# Patient Record
Sex: FEMALE | Race: WHITE | NOT HISPANIC OR LATINO | Employment: STUDENT | URBAN - METROPOLITAN AREA
[De-identification: names, ages, dates, MRNs, and addresses within clinical notes are randomized per-mention and may not be internally consistent; named-entity substitution may affect disease eponyms.]

---
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
## 2018-01-14 VITALS — SYSTOLIC BLOOD PRESSURE: 120 MMHG | DIASTOLIC BLOOD PRESSURE: 70 MMHG | WEIGHT: 171 LBS | BODY MASS INDEX: 23.19 KG/M2

## 2019-02-24 ENCOUNTER — OFFICE VISIT (OUTPATIENT)
Dept: URGENT CARE | Age: 19
End: 2019-02-24
Payer: COMMERCIAL

## 2019-02-24 VITALS
RESPIRATION RATE: 16 BRPM | DIASTOLIC BLOOD PRESSURE: 67 MMHG | WEIGHT: 169 LBS | TEMPERATURE: 98 F | HEIGHT: 72 IN | SYSTOLIC BLOOD PRESSURE: 113 MMHG | HEART RATE: 74 BPM | BODY MASS INDEX: 22.89 KG/M2 | OXYGEN SATURATION: 94 %

## 2019-02-24 DIAGNOSIS — B37.9 CANDIDIASIS: ICD-10-CM

## 2019-02-24 DIAGNOSIS — N39.0 URINARY TRACT INFECTION WITH HEMATURIA, SITE UNSPECIFIED: Primary | ICD-10-CM

## 2019-02-24 DIAGNOSIS — R31.9 URINARY TRACT INFECTION WITH HEMATURIA, SITE UNSPECIFIED: Primary | ICD-10-CM

## 2019-02-24 LAB
SL AMB  POCT GLUCOSE, UA: ABNORMAL
SL AMB LEUKOCYTE ESTERASE,UA: ABNORMAL
SL AMB POCT BILIRUBIN,UA: ABNORMAL
SL AMB POCT BLOOD,UA: ABNORMAL
SL AMB POCT CLARITY,UA: ABNORMAL
SL AMB POCT COLOR,UA: YELLOW
SL AMB POCT KETONES,UA: ABNORMAL
SL AMB POCT NITRITE,UA: ABNORMAL
SL AMB POCT PH,UA: 6.5
SL AMB POCT SPECIFIC GRAVITY,UA: 1.02
SL AMB POCT URINE PROTEIN: ABNORMAL
SL AMB POCT UROBILINOGEN: 0.2

## 2019-02-24 PROCEDURE — 81002 URINALYSIS NONAUTO W/O SCOPE: CPT | Performed by: FAMILY MEDICINE

## 2019-02-24 PROCEDURE — G0382 LEV 3 HOSP TYPE B ED VISIT: HCPCS | Performed by: FAMILY MEDICINE

## 2019-02-24 PROCEDURE — 99283 EMERGENCY DEPT VISIT LOW MDM: CPT | Performed by: FAMILY MEDICINE

## 2019-02-24 PROCEDURE — 87086 URINE CULTURE/COLONY COUNT: CPT | Performed by: PHYSICIAN ASSISTANT

## 2019-02-24 RX ORDER — FLUCONAZOLE 150 MG/1
150 TABLET ORAL ONCE
Qty: 1 TABLET | Refills: 0 | Status: SHIPPED | OUTPATIENT
Start: 2019-02-24 | End: 2019-02-24

## 2019-02-24 RX ORDER — NITROFURANTOIN 25; 75 MG/1; MG/1
100 CAPSULE ORAL 2 TIMES DAILY
Qty: 14 CAPSULE | Refills: 0 | Status: SHIPPED | OUTPATIENT
Start: 2019-02-24 | End: 2019-03-03

## 2019-02-24 RX ORDER — NORGESTIMATE AND ETHINYL ESTRADIOL 7DAYSX3 28
1 KIT ORAL DAILY
COMMUNITY

## 2019-02-24 NOTE — PROGRESS NOTES
Started with a yeast infection or feeling uncomfortable 4 days ago  Tried Monistat for 2 days without relief  Now feels it's a UTI  Feels burning at the end of the stream today

## 2019-02-25 LAB — BACTERIA UR CULT: NORMAL

## 2019-02-27 NOTE — PROGRESS NOTES
[facility]        NAME: Florian Ruiz is a 19 y o  female  : 2000    MRN: [id number]  DATE: 2019  TIME: 10:53 AM    Assessment and Plan   Urinary tract infection with hematuria, site unspecified [N39 0, R31 9]  1  Urinary tract infection with hematuria, site unspecified  POCT urine dip    Urine culture    nitrofurantoin (MACROBID) 100 mg capsule   2  Candidiasis  fluconazole (DIFLUCAN) 150 mg tablet         Patient Instructions       Follow up with PCP in 3-5 days  Proceed to  ER if symptoms worsen  Chief Complaint     Chief Complaint   Patient presents with    Possible UTI         History of Present Illness       Patient here for evaluation of possible UTI  She has burning pressure with frequency with urination  She did have possible yeast infection which she treated with minimal relief        Review of Systems   Review of Systems      Current Medications       Current Outpatient Medications:     norgestimate-ethinyl estradiol (TRI-LINYAH) 0 18/0 215/0 25 MG-35 MCG per tablet, Take 1 tablet by mouth daily, Disp: , Rfl:     nitrofurantoin (MACROBID) 100 mg capsule, Take 1 capsule (100 mg total) by mouth 2 (two) times a day for 7 days, Disp: 14 capsule, Rfl: 0    phenazopyridine (PYRIDIUM) 200 mg tablet, Take 1 tablet by mouth 3 (three) times a day as needed for bladder spasms (Patient not taking: Reported on 2019), Disp: 4 tablet, Rfl: 0    Current Allergies     Allergies as of 2019    (No Known Allergies)            The following portions of the patient's history were reviewed and updated as appropriate: allergies, current medications, past family history, past medical history, past social history, past surgical history and problem list      Past Medical History:   Diagnosis Date    Exercise-induced asthma        Past Surgical History:   Procedure Laterality Date    ANTERIOR CRUCIATE LIGAMENT REPAIR      PATELLA SURGERY      WISDOM TOOTH EXTRACTION Family History   Problem Relation Age of Onset    No Known Problems Mother     No Known Problems Father          Medications have been verified  Objective   /67 (BP Location: Left arm, Patient Position: Sitting, Cuff Size: Standard)   Pulse 74   Temp 98 °F (36 7 °C) (Temporal)   Resp 16   Ht 6' 1" (1 854 m)   Wt 76 7 kg (169 lb)   LMP 02/24/2019   SpO2 94%   BMI 22 30 kg/m²        Physical Exam     Physical Exam   Constitutional: She is oriented to person, place, and time  She appears well-developed and well-nourished  No distress  HENT:   Head: Normocephalic and atraumatic  Abdominal: Soft  Bowel sounds are normal  She exhibits no distension  There is no tenderness  There is no guarding  No CVA tenderness   Neurological: She is alert and oriented to person, place, and time  Skin: Skin is warm and dry  Psychiatric: She has a normal mood and affect  Her behavior is normal    Nursing note and vitals reviewed

## 2020-10-05 ENCOUNTER — APPOINTMENT (RX ONLY)
Dept: URBAN - METROPOLITAN AREA CLINIC 16 | Facility: CLINIC | Age: 20
Setting detail: DERMATOLOGY
End: 2020-10-05

## 2020-10-05 DIAGNOSIS — L738 OTHER SPECIFIED DISEASES OF HAIR AND HAIR FOLLICLES: ICD-10-CM

## 2020-10-05 DIAGNOSIS — L73.9 FOLLICULAR DISORDER, UNSPECIFIED: ICD-10-CM

## 2020-10-05 DIAGNOSIS — L71.0 PERIORAL DERMATITIS: ICD-10-CM

## 2020-10-05 DIAGNOSIS — Q819 OTHER SPECIFIED ANOMALIES OF SKIN: ICD-10-CM

## 2020-10-05 DIAGNOSIS — L663 OTHER SPECIFIED DISEASES OF HAIR AND HAIR FOLLICLES: ICD-10-CM

## 2020-10-05 DIAGNOSIS — Q826 OTHER SPECIFIED ANOMALIES OF SKIN: ICD-10-CM

## 2020-10-05 DIAGNOSIS — Q828 OTHER SPECIFIED ANOMALIES OF SKIN: ICD-10-CM

## 2020-10-05 PROBLEM — L02.426 FURUNCLE OF LEFT LOWER LIMB: Status: ACTIVE | Noted: 2020-10-05

## 2020-10-05 PROBLEM — L02.424 FURUNCLE OF LEFT UPPER LIMB: Status: ACTIVE | Noted: 2020-10-05

## 2020-10-05 PROBLEM — L02.425 FURUNCLE OF RIGHT LOWER LIMB: Status: ACTIVE | Noted: 2020-10-05

## 2020-10-05 PROBLEM — L85.8 OTHER SPECIFIED EPIDERMAL THICKENING: Status: ACTIVE | Noted: 2020-10-05

## 2020-10-05 PROBLEM — L02.423 FURUNCLE OF RIGHT UPPER LIMB: Status: ACTIVE | Noted: 2020-10-05

## 2020-10-05 PROCEDURE — ? DIAGNOSIS COMMENT

## 2020-10-05 PROCEDURE — 99202 OFFICE O/P NEW SF 15 MIN: CPT

## 2020-10-05 PROCEDURE — ? TREATMENT REGIMEN

## 2020-10-05 PROCEDURE — ? COUNSELING

## 2020-10-05 PROCEDURE — ? PRESCRIPTION

## 2020-10-05 RX ORDER — CLINDAMYCIN PHOSPHATE 10 MG/ML
LOTION TOPICAL
Qty: 1 | Refills: 2 | Status: ERX | COMMUNITY
Start: 2020-10-05

## 2020-10-05 RX ORDER — PIMECROLIMUS 10 MG/G
CREAM TOPICAL BID
Qty: 1 | Refills: 1 | Status: ERX | COMMUNITY
Start: 2020-10-05

## 2020-10-05 RX ORDER — METRONIDAZOLE 7.5 MG/G
CREAM TOPICAL
Qty: 1 | Refills: 3 | Status: ERX | COMMUNITY
Start: 2020-10-05

## 2020-10-05 RX ORDER — DOXYCYCLINE HYCLATE 100 MG/1
CAPSULE, GELATIN COATED ORAL
Qty: 60 | Refills: 2 | Status: ERX | COMMUNITY
Start: 2020-10-05

## 2020-10-05 RX ORDER — TRETIONIN 0.25 MG/G
CREAM TOPICAL
Qty: 1 | Refills: 4 | Status: ERX | COMMUNITY
Start: 2020-10-05

## 2020-10-05 RX ADMIN — DOXYCYCLINE HYCLATE: 100 CAPSULE, GELATIN COATED ORAL at 00:00

## 2020-10-05 RX ADMIN — TRETIONIN: 0.25 CREAM TOPICAL at 00:00

## 2020-10-05 RX ADMIN — METRONIDAZOLE: 7.5 CREAM TOPICAL at 00:00

## 2020-10-05 RX ADMIN — PIMECROLIMUS: 10 CREAM TOPICAL at 00:00

## 2020-10-05 RX ADMIN — CLINDAMYCIN PHOSPHATE: 10 LOTION TOPICAL at 00:00

## 2020-10-05 ASSESSMENT — LOCATION ZONE DERM
LOCATION ZONE: LIP
LOCATION ZONE: ARM
LOCATION ZONE: LEG
LOCATION ZONE: FACE

## 2020-10-05 ASSESSMENT — LOCATION SIMPLE DESCRIPTION DERM
LOCATION SIMPLE: LEFT POSTERIOR UPPER ARM
LOCATION SIMPLE: RIGHT UPPER ARM
LOCATION SIMPLE: RIGHT POSTERIOR UPPER ARM
LOCATION SIMPLE: RIGHT LIP
LOCATION SIMPLE: LEFT THIGH
LOCATION SIMPLE: LEFT UPPER ARM
LOCATION SIMPLE: RIGHT THIGH
LOCATION SIMPLE: LEFT LIP
LOCATION SIMPLE: CHIN

## 2020-10-05 ASSESSMENT — LOCATION DETAILED DESCRIPTION DERM
LOCATION DETAILED: LEFT ANTERIOR DISTAL THIGH
LOCATION DETAILED: LEFT NASOLABIAL FOLD
LOCATION DETAILED: RIGHT ANTERIOR DISTAL UPPER ARM
LOCATION DETAILED: RIGHT ANTERIOR DISTAL THIGH
LOCATION DETAILED: LEFT PROXIMAL POSTERIOR UPPER ARM
LOCATION DETAILED: LEFT ANTERIOR DISTAL UPPER ARM
LOCATION DETAILED: LEFT CHIN
LOCATION DETAILED: RIGHT PROXIMAL POSTERIOR UPPER ARM
LOCATION DETAILED: RIGHT UPPER CUTANEOUS LIP

## 2020-10-05 NOTE — PROCEDURE: TREATMENT REGIMEN
Initiate Treatment: Start doxycycline 100mg by mouth twice daily. If getting too much stomach upset, then decrease dose to once daily.\\nStart metronidazole cream once daily in the morning.\\nStart Elidel cream twice daily as needed for rash or itching, stop when improved.
Detail Level: Simple
Initiate Treatment: Take doxycycline 100mg by mouth twice daily. \\Heaven shower, wash with over-the-counter benzoyl peroxide wash (either CeraVe acne wash or PanOxyl). Afterwards, apply clindamycin lotion to any bumps. \\n\\nWhen shave, use multi-blade razor with 1 pass in direction of hair growth and use Aveeno shave gel
Initiate Treatment: Avoid scrubbing or scratching skin. \\nStart tretinoin 0.025% cream. Mix into CeraVe SA lotion, apply nightly as tolerated to affected areas on upper extremities. If getting too dry, reduce frequency of use.

## 2020-10-05 NOTE — PROCEDURE: DIAGNOSIS COMMENT
Detail Level: Simple
Comment: Patient counseled on diagnosis, start regimen as below. No clear inciting trigger (patient is not using any topical or inhaled steroids). May be exacerbated by wearing mask during COVID pandemic.

## 2020-10-08 RX ORDER — PIMECROLIMUS 10 MG/G
CREAM TOPICAL BID
Qty: 1 | Refills: 1 | Status: ERX

## 2021-01-20 ENCOUNTER — APPOINTMENT (RX ONLY)
Dept: URBAN - METROPOLITAN AREA CLINIC 16 | Facility: CLINIC | Age: 21
Setting detail: DERMATOLOGY
End: 2021-01-20

## 2021-01-20 DIAGNOSIS — Q826 OTHER SPECIFIED ANOMALIES OF SKIN: ICD-10-CM | Status: IMPROVED

## 2021-01-20 DIAGNOSIS — Q828 OTHER SPECIFIED ANOMALIES OF SKIN: ICD-10-CM | Status: IMPROVED

## 2021-01-20 DIAGNOSIS — L71.0 PERIORAL DERMATITIS: ICD-10-CM | Status: IMPROVED

## 2021-01-20 DIAGNOSIS — Q819 OTHER SPECIFIED ANOMALIES OF SKIN: ICD-10-CM | Status: IMPROVED

## 2021-01-20 DIAGNOSIS — D22 MELANOCYTIC NEVI: ICD-10-CM

## 2021-01-20 PROBLEM — D22.5 MELANOCYTIC NEVI OF TRUNK: Status: ACTIVE | Noted: 2021-01-20

## 2021-01-20 PROBLEM — L85.8 OTHER SPECIFIED EPIDERMAL THICKENING: Status: ACTIVE | Noted: 2021-01-20

## 2021-01-20 PROCEDURE — ? MEDICATION COUNSELING

## 2021-01-20 PROCEDURE — ? PRESCRIPTION

## 2021-01-20 PROCEDURE — ? SUNSCREEN RECOMMENDATIONS

## 2021-01-20 PROCEDURE — ? DIAGNOSIS COMMENT

## 2021-01-20 PROCEDURE — 99214 OFFICE O/P EST MOD 30 MIN: CPT

## 2021-01-20 PROCEDURE — ? TREATMENT REGIMEN

## 2021-01-20 PROCEDURE — ? COUNSELING

## 2021-01-20 RX ORDER — TRETIONIN 0.25 MG/G
CREAM TOPICAL
Qty: 1 | Refills: 2 | Status: ERX

## 2021-01-20 ASSESSMENT — LOCATION DETAILED DESCRIPTION DERM
LOCATION DETAILED: RIGHT SUPERIOR LATERAL BUCCAL CHEEK
LOCATION DETAILED: LEFT NASOLABIAL FOLD
LOCATION DETAILED: RIGHT PROXIMAL POSTERIOR UPPER ARM
LOCATION DETAILED: LEFT PROXIMAL POSTERIOR UPPER ARM
LOCATION DETAILED: RIGHT UPPER CUTANEOUS LIP
LOCATION DETAILED: RIGHT SUPERIOR UPPER BACK
LOCATION DETAILED: LEFT CHIN
LOCATION DETAILED: LEFT SUPERIOR LATERAL BUCCAL CHEEK

## 2021-01-20 ASSESSMENT — LOCATION ZONE DERM
LOCATION ZONE: FACE
LOCATION ZONE: TRUNK
LOCATION ZONE: LIP
LOCATION ZONE: ARM

## 2021-01-20 ASSESSMENT — LOCATION SIMPLE DESCRIPTION DERM
LOCATION SIMPLE: RIGHT UPPER BACK
LOCATION SIMPLE: CHIN
LOCATION SIMPLE: LEFT LIP
LOCATION SIMPLE: LEFT CHEEK
LOCATION SIMPLE: RIGHT LIP
LOCATION SIMPLE: LEFT POSTERIOR UPPER ARM
LOCATION SIMPLE: RIGHT CHEEK
LOCATION SIMPLE: RIGHT POSTERIOR UPPER ARM

## 2021-01-20 NOTE — PROCEDURE: DIAGNOSIS COMMENT
Detail Level: Simple
Comment: Improved, not examined today. No flare per patient since stopping doxycycline
Render Risk Assessment In Note?: no
Comment: S/p waist-up skin examination today

## 2021-01-20 NOTE — PROCEDURE: MEDICATION COUNSELING
Xeljaronz Pregnancy And Lactation Text: This medication is Pregnancy Category D and is not considered safe during pregnancy.  The risk during breast feeding is also uncertain.

## 2021-01-20 NOTE — PROCEDURE: TREATMENT REGIMEN
Discontinue Regimen: Remain off doxycycline
Continue Regimen: For flares of rash around mouth: \\nUse metronidazole cream once daily in the morning.\\nUse Elidel cream twice daily as needed for rash or itching, stop when improved.
Detail Level: Simple
Continue Regimen: Avoid scrubbing or scratching skin. \\nContinue tretinoin 0.025% cream. Mix into CeraVe SA lotion, apply nightly as tolerated to affected areas on upper extremities. If getting too dry, reduce frequency of use.
Initiate Treatment: Start tretinoin 0.025% cream. Apply a pea-sized amount to face at night. Start with using every few nights and increase to nightly as tolerated. Ok to apply moisturizer over top. \\n\\nUse Elidel cream twice daily only as needed for redness or irritation

## 2022-04-20 NOTE — PROCEDURE: MEDICATION COUNSELING
show Sarecycline Counseling: Patient advised regarding possible photosensitivity and discoloration of the teeth, skin, lips, tongue and gums.  Patient instructed to avoid sunlight, if possible.  When exposed to sunlight, patients should wear protective clothing, sunglasses, and sunscreen.  The patient was instructed to call the office immediately if the following severe adverse effects occur:  hearing changes, easy bruising/bleeding, severe headache, or vision changes.  The patient verbalized understanding of the proper use and possible adverse effects of sarecycline.  All of the patient's questions and concerns were addressed.

## 2022-07-04 NOTE — PROCEDURE: MEDICATION COUNSELING
Normal for race Azathioprine Pregnancy And Lactation Text: This medication is Pregnancy Category D and isn't considered safe during pregnancy. It is unknown if this medication is excreted in breast milk.